# Patient Record
Sex: MALE | Race: WHITE | NOT HISPANIC OR LATINO | Employment: OTHER | URBAN - METROPOLITAN AREA
[De-identification: names, ages, dates, MRNs, and addresses within clinical notes are randomized per-mention and may not be internally consistent; named-entity substitution may affect disease eponyms.]

---

## 2024-08-11 ENCOUNTER — APPOINTMENT (OUTPATIENT)
Dept: GENERAL RADIOLOGY | Facility: CLINIC | Age: 64
End: 2024-08-11
Attending: FAMILY MEDICINE

## 2024-08-11 ENCOUNTER — HOSPITAL ENCOUNTER (EMERGENCY)
Facility: CLINIC | Age: 64
Discharge: HOME | End: 2024-08-11
Attending: EMERGENCY MEDICINE | Admitting: EMERGENCY MEDICINE

## 2024-08-11 ENCOUNTER — APPOINTMENT (OUTPATIENT)
Dept: CT IMAGING | Facility: CLINIC | Age: 64
End: 2024-08-11
Attending: EMERGENCY MEDICINE

## 2024-08-11 VITALS
SYSTOLIC BLOOD PRESSURE: 157 MMHG | HEART RATE: 93 BPM | HEIGHT: 70 IN | WEIGHT: 185 LBS | DIASTOLIC BLOOD PRESSURE: 108 MMHG | RESPIRATION RATE: 16 BRPM | BODY MASS INDEX: 26.48 KG/M2 | TEMPERATURE: 98.5 F | OXYGEN SATURATION: 98 %

## 2024-08-11 DIAGNOSIS — M25.461 EFFUSION OF RIGHT KNEE: ICD-10-CM

## 2024-08-11 DIAGNOSIS — M25.561 ACUTE PAIN OF RIGHT KNEE: ICD-10-CM

## 2024-08-11 PROCEDURE — 73700 CT LOWER EXTREMITY W/O DYE: CPT | Mod: RT

## 2024-08-11 PROCEDURE — 73562 X-RAY EXAM OF KNEE 3: CPT | Mod: RT

## 2024-08-11 PROCEDURE — 99284 EMERGENCY DEPT VISIT MOD MDM: CPT | Mod: 25

## 2024-08-11 PROCEDURE — 99284 EMERGENCY DEPT VISIT MOD MDM: CPT | Performed by: EMERGENCY MEDICINE

## 2024-08-11 PROCEDURE — 250N000013 HC RX MED GY IP 250 OP 250 PS 637: Performed by: EMERGENCY MEDICINE

## 2024-08-11 RX ORDER — ACETAMINOPHEN 500 MG
1000 TABLET ORAL ONCE
Status: COMPLETED | OUTPATIENT
Start: 2024-08-11 | End: 2024-08-11

## 2024-08-11 RX ADMIN — ACETAMINOPHEN 1000 MG: 500 TABLET, FILM COATED ORAL at 19:14

## 2024-08-11 ASSESSMENT — ACTIVITIES OF DAILY LIVING (ADL)
ADLS_ACUITY_SCORE: 35
ADLS_ACUITY_SCORE: 35

## 2024-08-11 ASSESSMENT — COLUMBIA-SUICIDE SEVERITY RATING SCALE - C-SSRS
6. HAVE YOU EVER DONE ANYTHING, STARTED TO DO ANYTHING, OR PREPARED TO DO ANYTHING TO END YOUR LIFE?: NO
1. IN THE PAST MONTH, HAVE YOU WISHED YOU WERE DEAD OR WISHED YOU COULD GO TO SLEEP AND NOT WAKE UP?: NO
2. HAVE YOU ACTUALLY HAD ANY THOUGHTS OF KILLING YOURSELF IN THE PAST MONTH?: NO

## 2024-08-11 NOTE — ED PROVIDER NOTES
"  History     Chief Complaint   Patient presents with    Knee Pain     Pt was walking down stairs yesterday and his knee \"popped\" and he now has pain.  Pt is using crutches.  Pain with weightbearing.       HPI  Indra Bales is a 64 year old male who presents for right knee pain.  Symptoms started yesterday, he was walking down the stairs when he felt a pop in his knee and now it is swollen and painful, hurts to walk on it.  Hurts to move.  He has not taken thing for the pain.  No other injuries    Allergies:  No Known Allergies    Problem List:    Patient Active Problem List    Diagnosis Date Noted    CARDIOVASCULAR SCREENING; LDL GOAL LESS THAN 130 10/31/2010     Priority: Medium    Elevated BP 04/01/2009     Priority: Medium        Past Medical History:    No past medical history on file.    Past Surgical History:    No past surgical history on file.    Family History:    Family History   Problem Relation Age of Onset    Cancer Father         leukemia    Heart Disease Father         bypass    Cancer Sister         unsure what kind       Social History:  Marital Status:   [2]  Social History     Tobacco Use    Smoking status: Never   Substance Use Topics    Alcohol use: Yes     Comment: occassionally     Drug use: No        Medications:    No current outpatient medications on file.        Review of Systems    Physical Exam   BP: (!) 219/104  Pulse: 98  Temp: 98.5  F (36.9  C)  Resp: 16  Height: 177.8 cm (5' 10\")  Weight: 83.9 kg (185 lb)  SpO2: 98 %      Physical Exam  Constitutional:       General: He is not in acute distress.     Appearance: He is well-developed.   HENT:      Head: Normocephalic and atraumatic.   Cardiovascular:      Rate and Rhythm: Normal rate.   Pulmonary:      Effort: No respiratory distress.      Breath sounds: No stridor.   Musculoskeletal:      Comments: Right knee: Swollen and tender over the  proximal tibia; limited range of motion due to the pain.  The patient is able to " extend the leg off of the bed.   Skin:     General: Skin is warm and dry.   Neurological:      Mental Status: He is alert.         ED Course        Procedures              Critical Care time:  none               Results for orders placed or performed during the hospital encounter of 08/11/24 (from the past 24 hour(s))   XR Knee Right 3 Views    Narrative    EXAM: XR KNEE RIGHT 3 VIEWS  LOCATION: [facility]  DATE: 08/11/2024    INDICATION: Pain with weightbearing, heard pop and has had pain since yesterday.  COMPARISON: None.      Impression    IMPRESSION: Tricompartmental chondrocalcinosis. Moderately large effusion. Tiny patellar osteophytes. No evidence of fracture or calcified intra-articular body.   CT Knee Right w/o Contrast    Narrative    EXAM: CT KNEE RIGHT W/O CONTRAST  LOCATION: [facility]  DATE: 08/11/2024    INDICATION: Pain and swelling.  COMPARISON: 08/11/2024 radiographs.  TECHNIQUE: Noncontrast. Axial, sagittal, and coronal thin-section reconstruction. Dose reduction techniques were used.     FINDINGS:   BONES:  -No evidence of a fracture. No evidence of a lytic or sclerotic lesion in the visualized bones. Extensive chondrocalcinosis throughout the knee. Mild degenerative changes in the medial and lateral compartments.    SOFT TISSUES:  -No muscle atrophy. Large knee joint effusion.  Few intraligamentous calcifications in the ACL and PCL.      Impression    IMPRESSION:  1.  No fracture.    2.  Large knee joint effusion.    3.  Extensive chondrocalcinosis in the knee.    4.  Mild degenerative changes in the medial and lateral compartments.    5.  MRI would be helpful in further evaluation.       Medications   acetaminophen (TYLENOL) tablet 1,000 mg (1,000 mg Oral $Given 8/11/24 1914)       Assessments & Plan (with Medical Decision Making)   64-year-old male presents for pain and swelling of the right knee.  X-ray of the knee obtained, images  interpreted independently as well as radiology read reviewed, no obvious fracture or dislocation.  Large effusion.  Given the amount of swelling and pain and inability to bear weight CT of the knee obtained, images interpreted independently as well as radiology read reviewed, no signs of fracture or dislocation with this either.  He is safe to discharge home with instructions to use elevation, ice, acetaminophen, ibuprofen, follow-up with outpatient MRI that is ordered and a follow-up in orthopedic surgery clinic for recheck.  The patient is in agreement with this plan.    I have reviewed the nursing notes.    I have reviewed the findings, diagnosis, plan and need for follow up with the patient.           Medical Decision Making  The patient's presentation was of moderate complexity (an acute complicated injury).    The patient's evaluation involved:  ordering and/or review of 3+ test(s) in this encounter (see separate area of note for details)  independent interpretation of testing performed by another health professional (see separate area of note for details)    The patient's management necessitated only low risk treatment.        New Prescriptions    No medications on file       Final diagnoses:   Acute pain of right knee   Effusion of right knee       8/11/2024   [facility] EMERGENCY DEPT       Octavio Burden MD  08/11/24 1932

## 2024-08-11 NOTE — ED TRIAGE NOTES
"Pt was walking down stairs yesterday and his knee \"popped\" and he now has pain.  Pt is using crutches.  Pain with weightbearing.     Triage Assessment (Adult)       Row Name 08/11/24 [id number]          Triage Assessment    Airway WDL WDL        Respiratory WDL    Respiratory WDL WDL        Skin Circulation/Temperature WDL    Skin Circulation/Temperature WDL WDL        Cardiac WDL    Cardiac WDL WDL        Peripheral/Neurovascular WDL    Peripheral Neurovascular WDL WDL        Cognitive/Neuro/Behavioral WDL    Cognitive/Neuro/Behavioral WDL WDL                     "

## 2024-08-12 NOTE — DISCHARGE INSTRUCTIONS
Apply ice for 10 to 15 minutes at a time every couple of hours while awake.  Use acetaminophen and ibuprofen as needed for the pain.  Follow-up with the outpatient MRI and an orthopedic surgery clinic for recheck

## 2024-08-14 ENCOUNTER — LAB (OUTPATIENT)
Dept: LAB | Facility: CLINIC | Age: 64
End: 2024-08-14

## 2024-08-14 DIAGNOSIS — M25.569 KNEE PAIN: Primary | ICD-10-CM

## 2024-08-14 LAB
APPEARANCE FLD: ABNORMAL
CELL COUNT BODY FLUID SOURCE: ABNORMAL
COLOR FLD: ABNORMAL
CRYSTALS SNV MICRO: ABNORMAL
LYMPHOCYTES NFR FLD MANUAL: 0 %
MONOS+MACROS NFR FLD MANUAL: 10 %
NEUTS BAND NFR FLD MANUAL: 90 %
WBC # FLD AUTO: 6244 /UL

## 2024-08-14 PROCEDURE — 89051 BODY FLUID CELL COUNT: CPT

## 2024-08-14 PROCEDURE — 89060 EXAM SYNOVIAL FLUID CRYSTALS: CPT
